# Patient Record
Sex: MALE | Race: WHITE | NOT HISPANIC OR LATINO | URBAN - METROPOLITAN AREA
[De-identification: names, ages, dates, MRNs, and addresses within clinical notes are randomized per-mention and may not be internally consistent; named-entity substitution may affect disease eponyms.]

---

## 2023-05-29 DIAGNOSIS — K40.90 RIGHT INGUINAL HERNIA: Primary | ICD-10-CM

## 2023-05-31 ENCOUNTER — OFFICE VISIT (OUTPATIENT)
Dept: SURGERY | Facility: CLINIC | Age: 83
End: 2023-05-31
Payer: MEDICARE

## 2023-05-31 DIAGNOSIS — K40.90 RIGHT INGUINAL HERNIA: ICD-10-CM

## 2023-05-31 DIAGNOSIS — K40.90 INGUINAL HERNIA OF RIGHT SIDE WITHOUT OBSTRUCTION OR GANGRENE: Primary | ICD-10-CM

## 2023-05-31 PROCEDURE — 99203 OFFICE O/P NEW LOW 30 MIN: CPT | Mod: S$GLB,,, | Performed by: SURGERY

## 2023-05-31 PROCEDURE — 99203 PR OFFICE/OUTPT VISIT, NEW, LEVL III, 30-44 MIN: ICD-10-PCS | Mod: S$GLB,,, | Performed by: SURGERY

## 2023-05-31 RX ORDER — METOPROLOL SUCCINATE 50 MG/1
TABLET, EXTENDED RELEASE ORAL
COMMUNITY
Start: 2023-03-14

## 2023-05-31 RX ORDER — OMEPRAZOLE 20 MG/1
CAPSULE, DELAYED RELEASE ORAL
COMMUNITY
Start: 2023-05-15

## 2023-05-31 RX ORDER — LEVOTHYROXINE SODIUM 100 UG/1
100 TABLET ORAL
COMMUNITY
Start: 2023-03-31

## 2023-05-31 RX ORDER — ROSUVASTATIN CALCIUM 10 MG/1
TABLET, COATED ORAL
COMMUNITY
Start: 2023-03-07

## 2023-05-31 RX ORDER — NAPROXEN SODIUM 220 MG/1
81 TABLET, FILM COATED ORAL DAILY
COMMUNITY

## 2023-05-31 NOTE — LETTER
May 31, 2023    Archie Allen  [address]  [place] [address]             [facility] [facility] General Surgery  [address]  [facility] [address]  Phone: [phone]  Fax: [phone]     Cardiac Clearance Form       PLEASE PROVIDE ALL INFORMATION     Date: 2023  Dr. Hogue,     Please provide us with WRITTEN Cardiac Clearance on Mr/Mrs/Ms.  Archie Allen. : 1940.    Please send any test results such as EKG, Holter Monitor, Echocardiogram and or Stress test.    Patient will be scheduled for a Inguinal hernia repair under general anesthesia  with Dr. Papa Fam on 6/15/23.  Patient is taking aspirin     PLEASE FAX THIS CLEARANCE WITH TEST RESULTS [phone].  Thank you for your help in this matter.      Sincerely,  Munira Esparza  Oslbt-[phone]  Cpp-[phone]

## 2023-05-31 NOTE — PROGRESS NOTES
History & Physical    SUBJECTIVE:     History of Present Illness:    83-year-old male referred by Dr. Boni Lagunas for symptomatic right inguinal hernia.  Apparently affixed a hernia on the left side 20+ years ago.  He has a chronic bulge in the right inguinal region that he is had for many years but lately it is become uncomfortable and occasionally tender.  It is reducible and he has no signs or symptoms of obstruction or incarceration.  He does have a cardiac history having a cardiac stent in 2005 by Dr. King Jordan.  He is followed by Dr. Hogue in [place].  He states that he had an echocardiogram last month and is scheduled to see Dr. Cortés this month.  He is currently having no chest pain or angina.  No history of pulmonary problems.  He is moderately debilitated with arthritic problems as well as venous stasis ulcers i of his legs.    Chief Complaint   Patient presents with    Hernia         Review of patient's allergies indicates:  Review of patient's allergies indicates:  No Known Allergies    Current Outpatient Medications on File Prior to Visit   Medication Sig Dispense Refill    aspirin 81 MG Chew Take 81 mg by mouth once daily.      levothyroxine (SYNTHROID) 100 MCG tablet Take 100 mcg by mouth.      metoprolol succinate (TOPROL-XL) 50 MG 24 hr tablet TAKE 1 TABLET BY MOUTH DAILY thank youmike -)      omeprazole (PRILOSEC) 20 MG capsule TAKE 1 CAPSULE BY MOUTH DAILY thank youmiousmane -)      rosuvastatin (CRESTOR) 10 MG tablet TAKE 1 TABLET BY MOUTH AT BEDTIME thank neil -)       No current facility-administered medications on file prior to visit.       Past Medical History:   Diagnosis Date    Hyperlipidemia     Hypertension     Skin cancer      Past Surgical History:   Procedure Laterality Date    PROSTATE SURGERY       Family History   Problem Relation Age of Onset    Heart disease Father     Stomach cancer Sister        Social History     Socioeconomic History    Marital status: Unknown    Tobacco Use    Smoking status: Never    Smokeless tobacco: Never          Review of Systems   Constitutional: Negative.    Respiratory: Negative.     Cardiovascular: Negative.    Gastrointestinal:  Positive for abdominal pain.   Genitourinary: Negative.    Musculoskeletal:  Positive for falls and joint pain.   Neurological:  Positive for weakness.   Endo/Heme/Allergies:  Bruises/bleeds easily.   Psychiatric/Behavioral: Negative.       OBJECTIVE:     There were no vitals filed for this visit.              Physical Exam:  Physical Exam  Constitutional:       Appearance: He is normal weight.   HENT:      Head: Normocephalic.   Eyes:      Pupils: Pupils are equal, round, and reactive to light.   Cardiovascular:      Rate and Rhythm: Normal rate and regular rhythm.   Pulmonary:      Effort: Pulmonary effort is normal.      Breath sounds: Normal breath sounds.   Abdominal:      General: Abdomen is flat. Bowel sounds are normal.      Palpations: Abdomen is soft.      Hernia: A hernia is present.          Comments: Fairly large direct type right inguinal hernia, reducible   Musculoskeletal:         General: Swelling present.      Cervical back: Normal range of motion.      Right lower leg: Edema present.      Left lower leg: Edema present.   Skin:     General: Skin is warm and dry.   Neurological:      General: No focal deficit present.      Mental Status: He is alert and oriented to person, place, and time.      Cranial Nerves: No cranial nerve deficit.           ASSESSMENT/PLAN:   83-year-old with cardiac history has symptomatic right inguinal hernia and desires repair  PLAN:  Discussed with patient options including repair versus observation.  We will need to obtain cardiac clearance from Dr. Hogue and if he is high risk and I would not recommend surgery for his right inguinal hernia.  Although he is somewhat debilitated from limited mobility I think he would do fine with surgical intervention and repair of the right  inguinal hernia if it comes to this.  We discussed the limitations postoperatively especially with heavy lifting since she does care for his wife who is had a stroke.  He would like to have the surgery on June 15th since he will have his son in town to help care for him and his wife.  We will try to get him seen by Dr. Hogue before the 15th if possible